# Patient Record
Sex: FEMALE | Race: BLACK OR AFRICAN AMERICAN | NOT HISPANIC OR LATINO | Employment: STUDENT | URBAN - METROPOLITAN AREA
[De-identification: names, ages, dates, MRNs, and addresses within clinical notes are randomized per-mention and may not be internally consistent; named-entity substitution may affect disease eponyms.]

---
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
## 2024-06-06 ENCOUNTER — OFFICE VISIT (OUTPATIENT)
Dept: URGENT CARE | Facility: CLINIC | Age: 19
End: 2024-06-06
Payer: MEDICAID

## 2024-06-06 VITALS
RESPIRATION RATE: 18 BRPM | BODY MASS INDEX: 18.25 KG/M2 | TEMPERATURE: 98 F | SYSTOLIC BLOOD PRESSURE: 126 MMHG | HEART RATE: 64 BPM | DIASTOLIC BLOOD PRESSURE: 87 MMHG | HEIGHT: 63 IN | WEIGHT: 103 LBS | OXYGEN SATURATION: 99 %

## 2024-06-06 DIAGNOSIS — B35.1 ONYCHOMYCOSIS OF RIGHT GREAT TOE: Primary | ICD-10-CM

## 2024-06-06 PROCEDURE — 99213 OFFICE O/P EST LOW 20 MIN: CPT | Mod: S$GLB,,, | Performed by: NURSE PRACTITIONER

## 2024-06-06 NOTE — PROGRESS NOTES
"Subjective:      Patient ID: Andrey Cook is a 19 y.o. female.    Vitals:  height is 5' 3" (1.6 m) and weight is 46.7 kg (103 lb). Her oral temperature is 97.7 °F (36.5 °C). Her blood pressure is 126/87 and her pulse is 64. Her respiration is 18 and oxygen saturation is 99%.     Chief Complaint: No chief complaint on file.    19-year-old female seen in office due to left great toe concern.  She has acrylic nails and states the nail under the acrylic was broken prior to application and she feels that it is causing some pain.  Possible fungal infection of nail.  Minimal pain, however she states she would like the nail "checked out".        Constitution: Negative for chills and fever.   Cardiovascular:  Negative for chest pain, palpitations and sob on exertion.   Respiratory:  Negative for shortness of breath.    Gastrointestinal:  Negative for nausea and vomiting.   Musculoskeletal:  Positive for pain. Negative for trauma.   Skin:  Negative for rash, erythema and bruising.   Neurological:  Negative for disorientation and altered mental status.   Psychiatric/Behavioral:  Negative for altered mental status, disorientation and confusion.       Objective:     Physical Exam   Constitutional: She is oriented to person, place, and time. She appears well-developed. She is cooperative.  Non-toxic appearance. She does not appear ill. No distress.   HENT:   Head: Normocephalic and atraumatic.   Ears:   Right Ear: External ear normal.   Left Ear: External ear normal.   Nose: Nose normal.   Mouth/Throat: Oropharynx is clear and moist and mucous membranes are normal. Mucous membranes are moist.   Eyes: Conjunctivae and lids are normal. No scleral icterus.   Neck: Trachea normal and phonation normal. Neck supple.   Cardiovascular: Normal rate, regular rhythm, normal heart sounds and normal pulses.   Pulses:       Dorsalis pedis pulses are 2+ on the right side.   Pulmonary/Chest: Effort normal and breath sounds normal. No stridor. No " respiratory distress.   Abdominal: Normal appearance.   Musculoskeletal:         General: No deformity.        Feet:    Neurological: no focal deficit. She is alert and oriented to person, place, and time. She has normal strength and normal reflexes. No sensory deficit.   Skin: Skin is warm, dry, intact and not diaphoretic. Capillary refill takes 2 to 3 seconds. No erythema   Psychiatric: Her speech is normal and behavior is normal. Judgment and thought content normal.   Nursing note and vitals reviewed.      Assessment:     1. Onychomycosis of right great toe        Plan:       Onychomycosis of right great toe    The physical exam findings were discussed with the patient and all questions answered.  I advised patient that she will require removal of the acrylic nail for a full assessment.  There is no evidence of infection seen.  We discussed the need to follow up with Podiatry for further management.    she verbalized understanding and agreement with the plan of care.

## 2024-07-08 ENCOUNTER — TELEPHONE (OUTPATIENT)
Dept: OPHTHALMOLOGY | Facility: CLINIC | Age: 19
End: 2024-07-08
Payer: MEDICAID

## 2024-07-08 NOTE — TELEPHONE ENCOUNTER
Out of network with medicaid for routine exam, emailed list of providers in area    ----- Message from Kusum Garcia sent at 7/8/2024 11:25 AM CDT -----  Contact: pt @ [phone]  Andrey Cook calling regarding Appointment Access  (message) for #pt is calling to get appt for routine, asking for call back

## 2024-07-22 ENCOUNTER — HOSPITAL ENCOUNTER (EMERGENCY)
Facility: HOSPITAL | Age: 19
Discharge: HOME | End: 2024-07-22
Attending: EMERGENCY MEDICINE
Payer: MEDICAID

## 2024-07-22 VITALS
BODY MASS INDEX: 18.25 KG/M2 | HEIGHT: 63 IN | DIASTOLIC BLOOD PRESSURE: 68 MMHG | OXYGEN SATURATION: 100 % | WEIGHT: 103 LBS | SYSTOLIC BLOOD PRESSURE: 118 MMHG | RESPIRATION RATE: 21 BRPM | HEART RATE: 99 BPM | TEMPERATURE: 98 F

## 2024-07-22 DIAGNOSIS — K04.7 DENTAL ABSCESS: Primary | ICD-10-CM

## 2024-07-22 PROCEDURE — 99283 EMERGENCY DEPT VISIT LOW MDM: CPT

## 2024-07-22 PROCEDURE — 25000003 PHARM REV CODE 250: Performed by: EMERGENCY MEDICINE

## 2024-07-22 RX ORDER — AMOXICILLIN 875 MG/1
875 TABLET, FILM COATED ORAL 2 TIMES DAILY
Qty: 14 TABLET | Refills: 0 | Status: SHIPPED | OUTPATIENT
Start: 2024-07-22

## 2024-07-22 RX ORDER — IBUPROFEN 600 MG/1
600 TABLET ORAL EVERY 8 HOURS PRN
Qty: 30 TABLET | Refills: 0 | Status: SHIPPED | OUTPATIENT
Start: 2024-07-22

## 2024-07-22 RX ORDER — IBUPROFEN 600 MG/1
600 TABLET ORAL
Status: COMPLETED | OUTPATIENT
Start: 2024-07-22 | End: 2024-07-22

## 2024-07-22 RX ORDER — AMOXICILLIN 500 MG/1
1000 CAPSULE ORAL
Status: COMPLETED | OUTPATIENT
Start: 2024-07-22 | End: 2024-07-22

## 2024-07-22 RX ADMIN — IBUPROFEN 600 MG: 600 TABLET ORAL at 08:07

## 2024-07-22 RX ADMIN — AMOXICILLIN 1000 MG: 500 CAPSULE ORAL at 08:07

## 2024-07-23 NOTE — ED PROVIDER NOTES
Encounter Date: 7/22/2024       History     Chief Complaint   Patient presents with    Dental Pain     L side swollen     Epistaxis     X2 today      This is a 19-year-old female complaining of left lower dental pain and swelling.  This began yesterday, worsened today.  She tried to put some Orajel on the area and had bleeding from her swollen gums around her tooth.    The history is provided by the patient.     Review of patient's allergies indicates:  No Known Allergies  Past Medical History:   Diagnosis Date    Crohn's colitis 05/23/2022    Digestive disorder     Eczema     Encounter for blood transfusion      Past Surgical History:   Procedure Laterality Date    COLONOSCOPY N/A 05/23/2022    Procedure: COLONOSCOPY;  Surgeon: Michael Yap MD;  Location: [facility];  Service: Endoscopy;  Laterality: N/A;    ESOPHAGOGASTRODUODENOSCOPY N/A 05/23/2022    Procedure: EGD (ESOPHAGOGASTRODUODENOSCOPY);  Surgeon: Michael Yap MD;  Location: [facility];  Service: Endoscopy;  Laterality: N/A;    TOTAL COLECTOMY  11/29/2023     Family History   Problem Relation Name Age of Onset    Hypertension Mother      Hypertension Maternal Grandmother      Diabetes Paternal Grandmother      Ulcerative colitis Paternal Aunt       Social History     Tobacco Use    Smoking status: Never     Passive exposure: Never    Smokeless tobacco: Never   Substance Use Topics    Alcohol use: No    Drug use: Never     Review of Systems   HENT:  Positive for dental problem.    All other systems reviewed and are negative.      Physical Exam     Initial Vitals [07/22/24 2034]   BP Pulse Resp Temp SpO2   120/77 (!) 114 18 98 °F (36.7 °C) 100 %      MAP       --         Physical Exam    Nursing note and vitals reviewed.  Constitutional: She appears well-developed and well-nourished. She is not diaphoretic. No distress.   HENT:   Head: Normocephalic.   No obvious facial swelling.  No trismus or drooling.  There is tenderness and mild gingival  redness and swelling around her left lower wisdom tooth.  No active bleeding or discharge.   Eyes: Conjunctivae are normal.   Neck: Neck supple.   Normal range of motion.  Cardiovascular:  Normal rate.           Pulmonary/Chest: No respiratory distress.   Abdominal: She exhibits no distension.   Musculoskeletal:         General: No edema.      Cervical back: Normal range of motion and neck supple.     Neurological: She is alert. She has normal strength.   Skin: Skin is warm and dry.   Psychiatric: She has a normal mood and affect.         ED Course   Procedures  Labs Reviewed - No data to display       Imaging Results    None          Medications   amoxicillin capsule 1,000 mg (has no administration in time range)   ibuprofen tablet 600 mg (has no administration in time range)     Medical Decision Making  No evidence of significant dental abscess or impending airway compromise.  Discharged with oral antibiotics, ibuprofen, return precautions.  Stressed the need for dental follow up.    Risk  Prescription drug management.                                      Clinical Impression:  Final diagnoses:  [K04.7] Dental abscess (Primary)          ED Disposition Condition    Discharge Stable          ED Prescriptions       Medication Sig Dispense Start Date End Date Auth. Provider    amoxicillin (AMOXIL) 875 MG tablet Take 1 tablet (875 mg total) by mouth 2 (two) times daily. 14 tablet 7/22/2024 -- Mj Moura MD    ibuprofen (ADVIL,MOTRIN) 600 MG tablet Take 1 tablet (600 mg total) by mouth every 8 (eight) hours as needed for Pain. 30 tablet 7/22/2024 -- Mj Moura MD          Follow-up Information       Follow up With Specialties Details Why Contact Info    Dentist                 Mj Moura MD  07/22/24 2050

## 2024-07-23 NOTE — ED NOTES
Andrey Cook presents to the ED with c/o left lower dental pain and swelling. Patient reports that symptoms started yesterday. She has some mild bleeding at her gum line. She presents to the ED afebrile. Patient reports that oragel helps with some discomfort. Mucous membranes are pink and moist. Skin is warm, dry and intact. .  TIMOTHY VSS  Verified patient's name and date of birth.

## 2024-07-23 NOTE — ED NOTES
Upon discharge, patient is AAOx4, no cardiac or respiratory complications. Follow up care and  Medications have been reviewed with patient and has been instructed to return to the ER if needed. Patient verbalized understanding and ambulated to the lobby without difficulty. MEI AGUIRRE.

## 2024-07-23 NOTE — DISCHARGE INSTRUCTIONS
Return to the ER for worsening swelling or difficulty swallowing or breathing or for any other concerns.  Follow up with a dentist for further treatment.

## 2025-04-25 ENCOUNTER — OFFICE VISIT (OUTPATIENT)
Dept: URGENT CARE | Facility: CLINIC | Age: 20
End: 2025-04-25
Payer: MEDICAID

## 2025-04-25 VITALS
HEART RATE: 91 BPM | WEIGHT: 160 LBS | OXYGEN SATURATION: 100 % | SYSTOLIC BLOOD PRESSURE: 123 MMHG | BODY MASS INDEX: 28.35 KG/M2 | TEMPERATURE: 99 F | HEIGHT: 63 IN | DIASTOLIC BLOOD PRESSURE: 80 MMHG | RESPIRATION RATE: 19 BRPM

## 2025-04-25 DIAGNOSIS — S05.02XA ABRASION OF LEFT CORNEA, INITIAL ENCOUNTER: Primary | ICD-10-CM

## 2025-04-25 DIAGNOSIS — H57.89 EYE REDNESS: ICD-10-CM

## 2025-04-25 RX ORDER — ERYTHROMYCIN 5 MG/G
OINTMENT OPHTHALMIC 4 TIMES DAILY
Qty: 3.5 G | Refills: 0 | Status: SHIPPED | OUTPATIENT
Start: 2025-04-25 | End: 2025-05-02

## 2025-04-25 NOTE — PROGRESS NOTES
"Subjective:      Patient ID: Andrey Cook is a 20 y.o. female.    Vitals:  height is 5' 3" (1.6 m) and weight is 72.6 kg (160 lb). Her oral temperature is 98.8 °F (37.1 °C). Her blood pressure is 123/80 and her pulse is 91. Her respiration is 19 and oxygen saturation is 100%.     Chief Complaint: Eye Problem    Present to clinic for left eye irritation that she woke up with this morning.  She does wear corrective lenses, but not contacts.  Denies trauma, or known injury.  She does report itching and tearing.  She has been scratching.  Denies visual acuity changes, photophobia, headache, nausea, or recent uri.     Eye Problem   The left eye is affected. This is a new problem. The current episode started yesterday. The problem occurs constantly. The problem has been gradually worsening. There was no injury mechanism. The pain is at a severity of 1/10. The pain is mild. There is No known exposure to pink eye. She Does not wear contacts. Associated symptoms include an eye discharge, eye redness, a foreign body sensation and itching. Pertinent negatives include no blurred vision, double vision, fever, nausea, photophobia, recent URI or vomiting. She has tried nothing for the symptoms. The treatment provided no relief.       Constitution: Negative for fever.   Eyes:  Positive for eye discharge, eye itching and eye redness. Negative for foreign body in eye, photophobia, double vision, blurred vision and eyelid swelling.   Gastrointestinal:  Negative for nausea and vomiting.   Allergic/Immunologic: Negative for immunizations up-to-date.      Objective:     Physical Exam   Constitutional: She is oriented to person, place, and time. She is cooperative.  Non-toxic appearance. She does not appear ill. No distress.   HENT:   Head: Normocephalic and atraumatic.   Ears:   Right Ear: External ear normal.   Left Ear: External ear normal.   Nose: Nose normal.   Mouth/Throat: Uvula is midline, oropharynx is clear and moist and mucous " membranes are normal. Mucous membranes are moist. Oropharynx is clear.   Eyes: Lids are normal. Pupils are equal, round, and reactive to light. Lids are everted and swept, no foreign bodies found. No visual field deficit is present. Left eye exhibits discharge. Left eye exhibits no exudate and no hordeolum. Left conjunctiva is injected. No scleral icterus.   Slit lamp exam:       The left eye shows corneal abrasion and fluorescein uptake.     Extraocular movement intact vision grossly intact gaze aligned appropriately periorbital hyperpigmentation     Comments: Large abrasion at 5oclock position of pupil    Neck: Trachea normal and phonation normal. Neck supple.   Cardiovascular: Normal rate, regular rhythm, normal heart sounds and normal pulses.   Pulmonary/Chest: Effort normal and breath sounds normal. No stridor. She has no wheezes. She has no rhonchi. She has no rales.   Abdominal: Normal appearance.   Lymphadenopathy:     She has no cervical adenopathy.   Neurological: no focal deficit. She is alert, oriented to person, place, and time and at baseline. She has normal motor skills, normal sensation and intact cranial nerves (2-12). Gait and coordination normal. GCS eye subscore is 4. GCS verbal subscore is 5. GCS motor subscore is 6.   Skin: Skin is warm, dry and not diaphoretic. Capillary refill takes 2 to 3 seconds.   Psychiatric: She experiences Normal attention and Normal perception. Her speech is normal and behavior is normal. Mood, judgment and thought content normal.   Nursing note and vitals reviewed.      Assessment:     1. Abrasion of left cornea, initial encounter    2. Eye redness      Vision Screening    Right eye Left eye Both eyes   Without correction 20/200 20/200 20/200   With correction 20/20 20/20 20/20       Plan:       Abrasion of left cornea, initial encounter  -     Tdap (BOOSTRIX) vaccine injection 0.5 mL  -     erythromycin (ROMYCIN) ophthalmic ointment; Place into the right eye 4 (four)  times daily. for 7 days  Dispense: 3.5 g; Refill: 0    Eye redness          Medical Decision Making:   Initial Assessment:   Large abrasion present on stain exam.  No v/a changes. Educated to avoid contact lenses. F/u with ophthalmology in 72hrs.  Ed for worsening symptoms.  Tdap 2015 updated  today  Differential Diagnosis:   Conjunctivitis, foreign body

## 2025-04-25 NOTE — PATIENT INSTRUCTIONS
Do not wear contact lenses.  Avoid scratching or rubbing. Finish abx until completed.  Follow up with ophthalmology in 72 hrs

## 2025-07-01 ENCOUNTER — TELEPHONE (OUTPATIENT)
Dept: FAMILY MEDICINE | Facility: CLINIC | Age: 20
End: 2025-07-01
Payer: MEDICAID

## 2025-07-01 NOTE — TELEPHONE ENCOUNTER
----- Message from Raj Latif sent at 7/1/2025  8:11 AM CDT -----  Pt calling requesting a copy of shot records     Pt # [phone]